# Patient Record
(demographics unavailable — no encounter records)

---

## 2025-01-30 NOTE — REASON FOR VISIT
[Follow-Up: _____] : a [unfilled] follow-up visit [FreeTextEntry1] : pt came today to talk about her migraines.

## 2025-01-30 NOTE — ASSESSMENT
[FreeTextEntry1] : For prevention, Nurtec every other day   Continue propranolol 70 mg extended release plus immediate release daily.  The propranolol targets migraines as well as tremor.  Tremor is functional and likely related to anxiety and stress.  She is on Effexor for anxiety.  She is taking 25 mg daily Maintain hydration.  Maintain headache diary.  For rescue, continue rizatriptan.   Follow-up in 6 months.

## 2025-01-30 NOTE — PHYSICAL EXAM
[FreeTextEntry1] : Physical examination \par  General: No acute distress, Awake, Alert. \par  \par  Mental status \par  Awake, alert, and oriented to person, time and place, Normal attention span and concentration, Recent and remote memory intact, Language intact, Fund of knowledge intact. \par  Cranial Nerves \par  II: VFF \par  III, IV, VI: PERRL, EOMI. \par  V: Facial sensation is normal B/L. \par  VII: Facial strength is normal B/L. \par  VIII: Gross hearing is intact. \par  IX, X: Palate is midline and elevates symmetrically. \par  XI: Trapezius normal strength. \par  XII: Tongue midline without atrophy or fasciculations. \par  \par  Motor exam \par  Muscle tone - no evidence of rigidity or resistance in all 4 extremities. \par  No atrophy or fasciculations \par  Muscle Strength: arms and legs, proximal and distal flexors and extensors are normal \par  No UE drift.\par  \par  \par  Gait \par  Normal\par

## 2025-01-30 NOTE — CONSULT LETTER
[Dear  ___] : Dear ~CHELO, [Courtesy Letter:] : I had the pleasure of seeing your patient, [unfilled], in my office today. [Please see my note below.] : Please see my note below. [FreeTextEntry3] : Sincerely,\par  \par  Daxa Nolasco M.D.\par

## 2025-01-30 NOTE — HISTORY OF PRESENT ILLNESS
[FreeTextEntry1] : 1/30/25 Lakeshia is here in follow-up.  She notes with the addition of Nurtec, the headaches have been much more improved.  She has no side effects to the Nurtec. Consistent triggers include storms and her menstrual cycle. September she had 2 headaches, October she had 4 headaches, November 3 migraines, and December she had 3 migraines.  January so far she has had 3 migraines.  She continues to work closely with her GI doctor who thinks that there is a gut brain issue.  She was given a supplement that helps her when she is feeling too full after eating very small meals. This is FDgard-taken for functional dyspepsia.  For breakthrough pain, rizatriptan works well.  Tremor under good control.  8/14/24 Patient is here in follow-up.  She is noticing increased number of headaches over the last few months.  She does notice that the weather is a consistent trigger.  Her worst headache was when she had her.  Around the time of a big storm as well.  She has been increasingly using more triptans to break the headache.  In July she had a lot of headaches but in addition to migraines there were headaches associated with a viral illness.  Her Effexor has been reduced to 31 mg daily.  There is plans to reduce that even further because there is some concern about it causing her GI issues.  Wake up migraines are much worse. Tremors are stable.  4/17/24 Lakeshia is here in follow-up.  Since last visit her headaches have been stable.  In January she had 6 migraines February she had 2 migraines and took rizatriptan twice  In March she had 6 migraines and had to take 7 rizatriptan's.  April she had 1 aura and took rizatriptan right away which aborted the migraine.    Triggers continue to be either her.  Or the weather.  She has had an extensive GI workup.  A colonoscopy was normal.  EGD revealed GI metaplasia and increased bile.  She was placed on sucralfate.  She may need a gastric emptying study.  She is tolerating propranolol 60 mg extended release +10 mg immediate release for a total of 70 daily.  When she reduced it to less than this she noted difficulty with exercise and worsening fatigue and anxiety.  At this dose she is doing well. She is taking citalopram 10 mg daily.  She is taking Effexor 37.5 mg daily.  Tremor stable. 12/26/23 Lakeshia is here in follow-up. June 6 headaches (6 ST) July-6 headaches (8 ST) August-2 headaches (2 ST) September-2 headaches (2 ST) October-2 headaches (2 ST) November for headaches (6 ST) December 5 headaches (7 ST)  Triggers include weather changes, menstrual cycle, and air quality.  She also notes stress being a trigger.  Since the beginning of September she has been having some reflux and abdominal pain.  Endoscopy and colonoscopy are pending.  Since then she has been on omeprazole and notes some relief.  She has stopped taking Aleve except for menstrual cramps.  No focal or lateralizing neurologic deficits.  Tremor remains stable.  It usually increases when she is stressed out.  She notes overall her stress has been well-controlled.  6/14/23 Jan- 3 (4 ST) Feb- 4 (5 ST) March- strep, and period - 2 migraines (2 ST) April - 2 haleigh (2 ST) may- 6 june- 3   Feels headaches are overall well controlled. Tremors are stable and only worse with anxiety.  1/11/23 Doing well on Propranolol 80 mg (initially noted light headedness) October - 4 migraines (4 ST)- Triggers: period and stress November- 2 migraines (2 ST)- Triggers: ?stress, end of period. December- 5 migraines (6 ST)- Triggers: weather and period January- 2 migraines (3 ST)- related to her period.  Noted this migraine was harder to break -   Tremors are under good control- notes no more change from 60 mg to 80 mg - notes worsening with bad dreams or anxiety; they tend to be worse at night - feels propranolol more helpful than Ativan and will talk to psychiatrist about this.  Increased dose of Sumatriptan is being tolerated.  No lateralizing deficits.  10/5/22 [place]- 7 ST (4 migraines) July- 7 ST (5 migraines) August- 3 ST (2 migraines) September - 8 ST (9 migraine) September increased ha due to weather changes and stress  Had to take Naproxen and Aleve with this.  No lateralizing deficits.   Tolerating all medications  Citalopram 15 mg daily Effexor 37.5 mg daily  Tremor is better and tolerable (more prominent when stressed)  6/15/22 March 18  ER propranolol helps with anxiety  End of march - one migraine, 2 ST April - 5 migraine - 8 ST (?allergies) May- 4 migraines- 4 ST June- 1 migraine so far  For two of the migraines, extreme fatigue the day before.  Trigger periods  increased stress at work ?weather changes  Effexor 37.5 daily stopped Zoloft in December  3/9/22 Propranolol helps anxiety a lot. since stopping  Sertraline- light headedness resolved  Dec 4 - used 4 Sumatriptan Jan 7- used 7 Sumatriptan Feb 5- used 6 March 2- 4   Triggers- periods and PTSD symptoms  Sumatriptan helps break your headache.  Venlafaxine 37.5  Sertraline stopped   12/8/21 Notes Propranolol helps with anxiety and tension tremors decreased when increased to 40 mg daily Propranolol 40 mg makes her feel light headed - notes around lunch time- sitting to standing position. No LOC- feels light headed for a minute. Noted last time was 11/27,  Number of migraines have increased- notes it with periods October - 7 (related to menses) November- 6 (no relation to menses)  Rescue- tried Sumatriptan - and this worked most of the time.  10/6/21 Notes increased migraines around periods. ST helps with light and sound sensitivity and the headache- usually only takes one for migraine to go away- rarely has to take an another one. June- 5 migraines July- 2 migraines August- 2 migraines September- 5 migraines  If she takes Sumatriptan as soon as the aura, migraine is relieved more quickly.  Sleeping well overall. Eating well. Drinking water regularly.  Taking Migrelief. Propranolol 20 mg daily (occasional fatigue)- at night.  Tremor much better at present but was worse when job was more stressful. Also increase when she focuses on stressful past event.  Citalopram 15 mg daily (10 mg tab) Venlafaxine 25 mg daily Lorazepam 0.5 mg twice daily Sertraline 25 mg tab- 1/2 daily  6/9/21 Here in f/u. With increase in Propranolol to 20 mg daily- notes headaches occur 2 times a month. Tremors improved too - notes them when she thinks about them more. Notes some association with menstrual cycle. Some headaches have an aura of stars and others have associated flashing lights.  Has been taking Migrelief Excedrin and Aleve help headaches intermittently but not always consistently.  MRI brain is normal.  3/10/21 This is a 30 y/o woman who is being seen in neurologic consultation for evaluation of headaches and tremor.   Lakeshia has a history of migraines with aura which initially started in HS. Her frequency which was on average twice a month has increased to at least 6 headaches per month over the last year or so.   Headaches are unilateral (no side predominant)- with significant light and sound sensitivity. No nausea or vomiting. Throbbing, pulsating headache. Prior to headache she has an aura of sparkly bright lights in her vision which can occur up to 24 hours prior to the headache.  She has tried OTC medication like Advil which doesn't work- Excedrin is beneficial on occasion.  Works as an assistant . She does not drink a lot of caffeine. Sleeps well. Water intake 32 ounces. Triggers- none There is occasional increase around her menstrual cycle.  Her second complaint is of a tremor.  She first developed this in HS after a traumatic event. It resolved subsequently. She underwent another traumatic event this past fall and there was the onset of a tremor again. Tremor is predominantly left sided involving the leg more than the arm.  If she wakes up with a tremor she knows she will experience it through out the day- if she doesn't wake up with one, she tends to remain normal.  Lakeshia notes tension in her wrist and leg and then describes an urge to move - the tension is relieved with movement.  Occasionally this will spread to the right side but that is rare.  Lakeshia sees Dr. Mariama Cazares for generalized anxiety disorder and PTSD. She describes herself as being extremely sensitive to medications. Currently taking low doses of Citalopram (15 mg), Sertraline 12.5 mg, and Venlafaxine (low dose, she says 25 mg) along with Ativan as needed.   She was given propranolol as needed for the tremor - initially took 5 mg and now takes 10 mg as needed.  Not using any oral contraceptives.

## 2025-06-18 NOTE — CONSULT LETTER
[Dear  ___] : Dear  [unfilled], [Consult Letter:] : I had the pleasure of evaluating your patient, [unfilled]. [Please see my note below.] : Please see my note below. [Consult Closing:] : Thank you very much for allowing me to participate in the care of this patient.  If you have any questions, please do not hesitate to contact me. [Sincerely,] : Sincerely, [FreeTextEntry3] : Harjit Soria MD tel: [phone] fax: [phone]

## 2025-06-18 NOTE — PHYSICAL EXAM
[Alert] : alert [Sclera] : the sclera and conjunctiva were normal [No Respiratory Distress] : no respiratory distress [Abdomen Soft] : soft [Abnormal Walk] : normal gait [Normal Color / Pigmentation] : normal skin color and pigmentation [Oriented To Time, Place, And Person] : oriented to person, place, and time

## 2025-06-18 NOTE — HISTORY OF PRESENT ILLNESS
[FreeTextEntry1] : HUSEYIN ZHOU  is being evaluated at the request of Dr. Ornelas for an opinion re: episodic reflux / constipation / early satiety / Nausea.  Occurs once monthly, lasts for 5 days. No clear precipitating or alleviating factors. Not linked to stress or menstual cycle  Patient states that she developed LUQ pain / reflux / bloating in 2023. Saw Dr. SUBHASH Ahuja who performed EGD / colon: Colonoscopy was unremarkable.  Repeat recommended at age 45.  EGD notable for gastric intestinal metaplasia / " excessive bile".  Started on Carafate BID / Pepcid. Pain / reflux / bloating seems to have resolved on Carsfate  / pepcid

## 2025-06-18 NOTE — CONSULT LETTER
[Dear  ___] : Dear  [unfilled], [Consult Letter:] : I had the pleasure of evaluating your patient, [unfilled]. [Please see my note below.] : Please see my note below. [Consult Closing:] : Thank you very much for allowing me to participate in the care of this patient.  If you have any questions, please do not hesitate to contact me. [Sincerely,] : Sincerely, [FreeTextEntry3] : Harjit Soria MD tel: [phone] fax: [phone]

## 2025-06-18 NOTE — ASSESSMENT
[FreeTextEntry1] : 1. Etiology of presentation unclear.  Continue Carafate for " bile reflux".  PRN Pepcid. Colonoscopy at age 45. Miralax prn for constipation. Zofran PRN for nausea. If sxs persist, will consider GES  2. EGD with gastric mapping given h/o intestinal metaplasia  Pertinent available records reviewed  Risks of the procedure including but not limited to bleeding / perforation / infection / anesthesia complication / missed polyp or lesion explained to the  patient . The patient expressed understanding and a desire to proceed with the procedure.  Risk of not doing procedure includes but is not limited to missed or delayed diagnosis of gastrointestinal pathology. A consultation note was provided to the referring provider